# Patient Record
Sex: FEMALE | Race: WHITE | Employment: OTHER | URBAN - METROPOLITAN AREA
[De-identification: names, ages, dates, MRNs, and addresses within clinical notes are randomized per-mention and may not be internally consistent; named-entity substitution may affect disease eponyms.]

---

## 2017-03-14 ENCOUNTER — HOSPITAL ENCOUNTER (OUTPATIENT)
Dept: VASCULAR SURGERY | Age: 82
Discharge: HOME | End: 2017-03-14
Attending: INTERNAL MEDICINE
Payer: MEDICARE

## 2017-03-14 DIAGNOSIS — I82.409 DVT (DEEP VENOUS THROMBOSIS) (HCC): ICD-10-CM

## 2017-03-14 PROCEDURE — 93971 EXTREMITY STUDY: CPT

## 2017-03-14 NOTE — PROCEDURES
[facility]  *** FINAL REPORT ***    Name: Jose Elias Burnett  MRN: [id number]    Outpatient  : [id number]  HIS Order #: [id number]  [id number] [facility] Visit #: [id number]  Date: 14 Mar 2017    TYPE OF TEST: Peripheral Venous Testing    REASON FOR TEST  Limb swelling    Left Leg:-  Deep venous thrombosis:           Yes  Proximal extent of thrombus:      Common Femoral  Superficial venous thrombosis:    Not examined  Deep venous insufficiency:        Not examined  Superficial venous insufficiency: Not examined      INTERPRETATION/FINDINGS  Duplex images were obtained using 2-D gray scale, color flow and  spectral doppler analysis. 1.This study suggests evidence of acute deep venous thrombosis  involving the left lower extremity, which is occlusive in nature. 2. The intraluminal thrombus is visualized in the left common femoral,   deep femoral, proximal femoral, mid femoral, distal femoral,  popliteal(above knee), popliteal(fossa), popliteal(below knee),  posterior tibial and peroneal veins. 3. No evidence of deep vein thrombosis in the contralateral right  common femoral vein. 4. There is no evidence of superficial venous thrombosis involving the   left greater saphenous vein at the present time. ADDITIONAL COMMENTS  [facility] notified [facility] results at 10:01 am.    I have personally reviewed the data relevant to the interpretation of  this  study. TECHNOLOGIST: Lala Ortiz  Signed: 2017 10:02 AM    PHYSICIAN: Homer Sena.  Brenda Fabian MD  Signed: 2017 02:28 PM